# Patient Record
Sex: FEMALE | Race: WHITE | URBAN - NONMETROPOLITAN AREA
[De-identification: names, ages, dates, MRNs, and addresses within clinical notes are randomized per-mention and may not be internally consistent; named-entity substitution may affect disease eponyms.]

---

## 2021-01-01 ENCOUNTER — OFFICE VISIT (OUTPATIENT)
Dept: URBAN - NONMETROPOLITAN AREA CLINIC 13 | Facility: CLINIC | Age: 81
End: 2021-01-01
Payer: MEDICARE

## 2021-01-01 ENCOUNTER — WEB ENCOUNTER (OUTPATIENT)
Dept: URBAN - NONMETROPOLITAN AREA CLINIC 13 | Facility: CLINIC | Age: 81
End: 2021-01-01

## 2021-01-01 ENCOUNTER — ERX REFILL RESPONSE (OUTPATIENT)
Dept: URBAN - NONMETROPOLITAN AREA CLINIC 13 | Facility: CLINIC | Age: 81
End: 2021-01-01

## 2021-01-01 ENCOUNTER — DASHBOARD ENCOUNTERS (OUTPATIENT)
Age: 81
End: 2021-01-01

## 2021-01-01 DIAGNOSIS — R10.13 EPIGASTRIC ABDOMINAL PAIN: ICD-10-CM

## 2021-01-01 DIAGNOSIS — R14.0 BLOATING: ICD-10-CM

## 2021-01-01 DIAGNOSIS — R11.0 NAUSEA: ICD-10-CM

## 2021-01-01 DIAGNOSIS — Z12.11 SCREENING FOR COLON CANCER: ICD-10-CM

## 2021-01-01 DIAGNOSIS — R19.7 DIARRHEA, UNSPECIFIED TYPE: ICD-10-CM

## 2021-01-01 PROCEDURE — 99203 OFFICE O/P NEW LOW 30 MIN: CPT | Performed by: INTERNAL MEDICINE

## 2021-01-01 PROCEDURE — 99214 OFFICE O/P EST MOD 30 MIN: CPT | Performed by: INTERNAL MEDICINE

## 2021-01-01 RX ORDER — PRAVASTATIN SODIUM 40 MG/1
TABLET ORAL
Qty: 90 UNSPECIFIED | Status: ACTIVE | COMMUNITY

## 2021-01-01 RX ORDER — POTASSIUM CHLORIDE 750 MG/1
TABLET, EXTENDED RELEASE ORAL
Qty: 180 UNSPECIFIED | Status: ACTIVE | COMMUNITY

## 2021-01-01 RX ORDER — FAMOTIDINE 40 MG/1
1 TABLET AT BEDTIME TABLET, FILM COATED ORAL ONCE A DAY
Qty: 30 TABLET | Refills: 6 | OUTPATIENT
Start: 2021-01-01

## 2021-01-01 RX ORDER — LATANOPROST 50 UG/ML
SOLUTION/ DROPS OPHTHALMIC
Qty: 7.5 UNSPECIFIED | Status: ACTIVE | COMMUNITY

## 2021-01-01 RX ORDER — TORSEMIDE 20 MG/1
TABLET ORAL
Qty: 90 UNSPECIFIED | Status: ACTIVE | COMMUNITY

## 2021-01-01 RX ORDER — FAMOTIDINE 40 MG/1
1 TABLET AT BEDTIME TABLET, FILM COATED ORAL ONCE A DAY
Qty: 30 TABLET | Refills: 6 | Status: ACTIVE | COMMUNITY
Start: 2021-01-01

## 2021-01-01 RX ORDER — LOSARTAN POTASSIUM 50 MG/1
TABLET, FILM COATED ORAL
Qty: 45 UNSPECIFIED | Status: ACTIVE | COMMUNITY

## 2021-01-01 RX ORDER — WARFARIN SODIUM 5 MG/1
TABLET ORAL
Qty: 90 UNSPECIFIED | Status: ACTIVE | COMMUNITY

## 2021-01-01 RX ORDER — SPIRONOLACTONE 25 MG/1
TABLET ORAL
Qty: 90 UNSPECIFIED | Status: ACTIVE | COMMUNITY

## 2021-01-01 RX ORDER — PREDNISONE 10 MG/1
TABLET ORAL
Qty: 90 UNSPECIFIED | Status: ACTIVE | COMMUNITY

## 2021-01-01 RX ORDER — CARVEDILOL 3.12 MG/1
TABLET, FILM COATED ORAL
Qty: 180 UNSPECIFIED | Status: ACTIVE | COMMUNITY

## 2021-01-01 RX ORDER — DICYCLOMINE HYDROCHLORIDE 10 MG/1
1 CAPSULE CAPSULE ORAL
Qty: 90 CAPSULE | Refills: 6 | OUTPATIENT

## 2021-01-01 RX ORDER — CLONAZEPAM 0.5 MG/1
TABLET ORAL
Qty: 60 UNSPECIFIED | Status: ACTIVE | COMMUNITY

## 2021-01-01 RX ORDER — DICYCLOMINE HYDROCHLORIDE 10 MG/1
1 CAPSULE CAPSULE ORAL
Qty: 90 CAPSULE | Refills: 6 | OUTPATIENT
Start: 2021-01-01 | End: 2022-05-13

## 2021-01-01 RX ORDER — ESCITALOPRAM OXALATE 20 MG/1
TABLET ORAL
Qty: 90 UNSPECIFIED | Status: ACTIVE | COMMUNITY

## 2021-01-01 RX ORDER — RIFAXIMIN 550 MG/1
1 TABLET TABLET ORAL THREE TIMES A DAY
Qty: 42 TABLET | Refills: 3 | OUTPATIENT
Start: 2021-01-01 | End: 2021-01-01

## 2021-01-01 RX ORDER — DICYCLOMINE HYDROCHLORIDE 10 MG/1
TID AC PRN DIARRHEA/CRAMPING CAPSULE ORAL THREE TIMES A DAY
Qty: 270 CAPSULES | Refills: 3 | OUTPATIENT

## 2021-06-18 NOTE — HPI-TODAY'S VISIT:
6/18/2021 The patient is a 80-year-old female who is referred by Dr. Belén Gil for evaluation and treatment of multiple GI issues.  She was previously following with Dr. Thrasher.  She has been admitted to [facility] along with [facility] for multiple times.  She has multiple medical problems including COPD and a history of lung cancer.  She also has coronary artery disease along with ventricular arrhythmias.  She has been having a lot of issues with low magnesium.  She has now taking magnesium twice a day.  One of her main issues today is diarrhea.  They have tried switching her magnesium, but the diarrhea continues.  Her other big issue is of gas and bloating.  She was admitted to the hospital at [place] and had an EGD by Dr. Thrasher.  She also had a colonoscopy by Dr. Thrasher several years ago.  They were going to do a trial of Xifaxan due to her multiple antibiotic use due to her multiple histories of pneumonia.  She has been on antibiotics for pneumonia over the past year.  She has a standing order for Levaquin as needed.  She states that she has nausea almost every day.  She has been taking some Zofran, and this does help.  She takes Nexium for reflux, but she has been having worsening reflux symptoms also.  She complains of a large amount of gas and bloating as well.  Her appetite has also decreased.  She was unable to afford the Xifaxan, and she has not been on a probiotic.  She does take a probiotic when she is on antibiotics.  She is also on Coumadin due to her history of atrial fibrillation.  We have discussed today about proceeding further with GI work-up.  We are going to do a trial of Xifaxan and probiotic.  I am also going to add some Metamucil to help bulk up her stools.  If the Xifaxan and probiotic do not show any improvement, then I do think she could tolerate an EGD and colonoscopy which we could schedule on her next visit.  We have had a long discussion about her symptoms today, and she is in agreement with this plan.

## 2021-06-18 NOTE — PHYSICAL EXAM NEUROLOGIC:
oriented to person, place and time ,  , cranial nerves 2-12 grossly intact
[FreeTextEntry1] : review of system normal other than stated in the history of present illness

## 2021-10-15 PROBLEM — 305058001: Status: ACTIVE | Noted: 2021-01-01

## 2021-10-15 PROBLEM — 79922009: Status: ACTIVE | Noted: 2021-01-01

## 2021-10-15 PROBLEM — 422587007: Status: ACTIVE | Noted: 2021-01-01

## 2021-10-15 PROBLEM — 116289008: Status: ACTIVE | Noted: 2021-01-01

## 2021-10-15 PROBLEM — 62315008: Status: ACTIVE | Noted: 2021-01-01

## 2021-10-15 NOTE — HPI-TODAY'S VISIT:
6/18/2021 The patient is a 80-year-old female who is referred by Dr. Belén Gil for evaluation and treatment of multiple GI issues.  She was previously following with Dr. Thrasher.  She has been admitted to [facility] along with [facility] for multiple times.  She has multiple medical problems including COPD and a history of lung cancer.  She also has coronary artery disease along with ventricular arrhythmias.  She has been having a lot of issues with low magnesium.  She has now taking magnesium twice a day.  One of her main issues today is diarrhea.  They have tried switching her magnesium, but the diarrhea continues.  Her other big issue is of gas and bloating.  She was admitted to the hospital at [place] and had an EGD by Dr. Thrasher.  She also had a colonoscopy by Dr. Thrasher several years ago.  They were going to do a trial of Xifaxan due to her multiple antibiotic use due to her multiple histories of pneumonia.  She has been on antibiotics for pneumonia over the past year.  She has a standing order for Levaquin as needed.  She states that she has nausea almost every day.  She has been taking some Zofran, and this does help.  She takes Nexium for reflux, but she has been having worsening reflux symptoms also.  She complains of a large amount of gas and bloating as well.  Her appetite has also decreased.  She was unable to afford the Xifaxan, and she has not been on a probiotic.  She does take a probiotic when she is on antibiotics.  She is also on Coumadin due to her history of atrial fibrillation.  We have discussed today about proceeding further with GI work-up.  We are going to do a trial of Xifaxan and probiotic.  I am also going to add some Metamucil to help bulk up her stools.  If the Xifaxan and probiotic do not show any improvement, then I do think she could tolerate an EGD and colonoscopy which we could schedule on her next visit.  We have had a long discussion about her symptoms today, and she is in agreement with this plan. 10/15/2021 The patient presents today for follow-up of her diarrhea.  She also has a history of gas and bloating along with reflux.  Since her last visit, she has been hospitalized with her end-stage lung disease.  She was actually sent home on hospice.  Her oxygen requirements increased dramatically.  This occurred several months ago.  Since that time, she is actually improved from a pulmonary standpoint.  During her work-up, she was treated with steroids along with antibiotics.  She was also receiving pain medication.  Her daughter also took her off magnesium, and her labs have remained stable.  Overall, her bowels have become more regular without any other intervention.  She was unable to get the Xifaxan due to the cost.  She continues to take Metamucil along with a probiotic.  She does continue to have nausea as well, we have had a long discussion about her medications, her diarrhea, and her constipation.  At this point, the first time she had diarrhea was secondary to anxiety when leaving the house.  We have discussed adding dicyclomine as needed.  We have also discussed remaining on dairy free diet.  We have also discussed clean eating, and this seems to have helped her symptoms more than anything.  She will continue her Nexium and Pepcid for now.

## 2022-01-01 ENCOUNTER — OFFICE VISIT (OUTPATIENT)
Dept: URBAN - NONMETROPOLITAN AREA CLINIC 13 | Facility: CLINIC | Age: 82
End: 2022-01-01

## 2022-07-01 ENCOUNTER — OFFICE VISIT (OUTPATIENT)
Dept: URBAN - NONMETROPOLITAN AREA CLINIC 2 | Facility: CLINIC | Age: 82
End: 2022-07-01

## 2022-07-22 ENCOUNTER — OFFICE VISIT (OUTPATIENT)
Dept: URBAN - NONMETROPOLITAN AREA CLINIC 2 | Facility: CLINIC | Age: 82
End: 2022-07-22

## 2022-09-02 ENCOUNTER — OFFICE VISIT (OUTPATIENT)
Dept: URBAN - NONMETROPOLITAN AREA CLINIC 2 | Facility: CLINIC | Age: 82
End: 2022-09-02